# Patient Record
Sex: MALE | Race: WHITE | NOT HISPANIC OR LATINO | URBAN - METROPOLITAN AREA
[De-identification: names, ages, dates, MRNs, and addresses within clinical notes are randomized per-mention and may not be internally consistent; named-entity substitution may affect disease eponyms.]

---

## 2021-09-05 ENCOUNTER — EMERGENCY (EMERGENCY)
Facility: HOSPITAL | Age: 51
LOS: 1 days | Discharge: HOME | End: 2021-09-05
Attending: EMERGENCY MEDICINE | Admitting: EMERGENCY MEDICINE
Payer: COMMERCIAL

## 2021-09-05 VITALS
RESPIRATION RATE: 18 BRPM | DIASTOLIC BLOOD PRESSURE: 76 MMHG | TEMPERATURE: 98 F | OXYGEN SATURATION: 97 % | SYSTOLIC BLOOD PRESSURE: 132 MMHG | HEART RATE: 62 BPM | WEIGHT: 184.97 LBS

## 2021-09-05 PROCEDURE — 99283 EMERGENCY DEPT VISIT LOW MDM: CPT | Mod: 25

## 2021-09-05 PROCEDURE — 29125 APPL SHORT ARM SPLINT STATIC: CPT

## 2021-09-05 PROCEDURE — 99284 EMERGENCY DEPT VISIT MOD MDM: CPT | Mod: 25

## 2021-09-05 PROCEDURE — 73130 X-RAY EXAM OF HAND: CPT | Mod: 26,RT

## 2021-09-05 PROCEDURE — 73130 X-RAY EXAM OF HAND: CPT

## 2021-09-05 PROCEDURE — 29125 APPL SHORT ARM SPLINT STATIC: CPT | Mod: RT

## 2021-09-05 RX ORDER — IBUPROFEN 200 MG
600 TABLET ORAL ONCE
Refills: 0 | Status: COMPLETED | OUTPATIENT
Start: 2021-09-05 | End: 2021-09-05

## 2021-09-05 RX ADMIN — Medication 600 MILLIGRAM(S): at 13:46

## 2021-09-05 NOTE — ED ADULT NURSE NOTE - NSFALLRSKASSESSDT_ED_ALL_ED
Discharge instructions given to patient, patient verbalizes understanding of instructions. Patient alert and oriented x3, denies pain or shortness of breath at this time. Patient ambulatory, gait steady. Patient armband removed and given to patient to take home.   Patient was informed of the privacy risks if armband lost or stolen
05-Sep-2021 12:27

## 2021-09-05 NOTE — ED ADULT NURSE NOTE - CCCP TRG CHIEF CMPLNT
We've already received a denial from [facility] due to not meeting policy criteria. The patient must have done ONE of the following:    - Previously failed another biologic immunomodulator w/FDA approval for the same indication or    - Used one conventional agent prerequisite for the indication or    - Documented intolerance, FDA labeled contraindication, or hypersensitivity to ALL conventional agents    *Conventional Agents  - aminosalicylates  - corticosteroids (including budesonide EC capsule)  - azathiporine  - 6-MP  - cyclosporine    I can appeal this with an LMN if he does not meet any of the criteria above.     How would you like to proceed?       hand pain/injury

## 2021-09-05 NOTE — ED ADULT NURSE NOTE - NSFALLRSKOUTCOME_ED_ALL_ED
KRISTINE SANDOVAL note, thanks!    Zeynep Yap, PharmD  Medication Therapy Management Pharmacist  [phone]       Universal Safety Interventions

## 2021-09-05 NOTE — ED PROVIDER NOTE - CLINICAL SUMMARY MEDICAL DECISION MAKING FREE TEXT BOX
51 y/o M presents for right hand injury, pain and swelling s/p punching a wall last night. Pt denies paresthesias, motor/sensory deficits, or other injuries. pt is right hand dominant. PE as noted above. XR images reviewed- 4th distal metacarpal fx. Pt placed in ulnar gutter splint. will dc with hand f/u

## 2021-09-05 NOTE — ED PROVIDER NOTE - OBJECTIVE STATEMENT
51 y/o M presents for right hand injury, pain and swelling s/p punching a wall last night. Pt denies paresthesias, motor/sensory deficits, or other injuries. pt is right hand dominant

## 2021-09-05 NOTE — ED PROVIDER NOTE - PATIENT PORTAL LINK FT
You can access the FollowMyHealth Patient Portal offered by [facility] by registering at the following website: [email or web address]. By joining [employer]’s FollowMyHealth portal, you will also be able to view your health information using other applications (apps) compatible with our system.

## 2021-09-05 NOTE — ED PROVIDER NOTE - PHYSICAL EXAMINATION
msk: + TTP and swelling over the right 4th and 5th metacarpals and fingers. +sensation and motor intact.   good cap refill   no neurovasc  compromise on exam

## 2021-09-05 NOTE — ED PROVIDER NOTE - ATTENDING CONTRIBUTION TO CARE
Laila with [facility]. 49 y/o M presents for right hand injury, pain and swelling s/p punching a wall last night. Pt denies paresthesias, motor/sensory deficits, or other injuries. pt is right hand dominant. PE as noted above. XR images reviewed- 4th distal metacarpal fx. Pt placed in ulnar gutter splint. will dc with hand f/u    I performed a face to face bedside interview with patient regarding history of present illness, review of symptoms and past medical history. I completed an independent physical exam.  I have discussed the patient's plan of care with Physician Assistant (PA). I agree with note as stated above, having amended the EMR as needed to reflect my findings.   This includes History of Present Illness, HIV, Past Medical/Surgical/Family/Social History, Allergies and Home Medications, Review of Systems, Physical Exam, and any Progress Notes during the time I functioned as the attending physician for this patient.

## 2021-09-05 NOTE — ED PROVIDER NOTE - NSFOLLOWUPINSTRUCTIONS_ED_ALL_ED_FT
Follow up with the hand specialist within 2-3 days.     Rest, Ice 15 min on/ 15 min off, Elevate and keep compression of affected area. Take Ibuprofen 600mg Orally every 6 hours as needed for pain. Take the prescribed medication as directed for severe pain.   Keep your splint clean and dry.    Stay hydrated    Return to the ER if your symptoms worsen or for any other medical emergencies  *****    Hand Fracture    WHAT YOU NEED TO KNOW:    A hand fracture is a break in a bone in your hand.    DISCHARGE INSTRUCTIONS:    Return to the emergency department if:   •You have severe pain that does not get better, even with pain medicine.      •Your injured hand or forearm is cold, numb, or pale.       •Your cast or splint gets wet, damaged, or comes off.      Call your doctor or hand specialist if:   •You have new sores around your cast or splint.      •You notice a bad smell coming from under your cast.      •You have questions or concerns about your condition or care.      Medicines: You may need any of the following:   •NSAIDs help decrease swelling and pain or fever. This medicine is available with or without a doctor's order. NSAIDs can cause stomach bleeding or kidney problems in certain people. If you take blood thinner medicine, always ask your healthcare provider if NSAIDs are safe for you. Always read the medicine label and follow directions.      •Acetaminophen decreases pain and fever. It is available without a doctor's order. Ask how much to take and how often to take it. Follow directions. Read the labels of all other medicines you are using to see if they also contain acetaminophen, or ask your doctor or pharmacist. Acetaminophen can cause liver damage if not taken correctly. Do not use more than 4 grams (4,000 milligrams) total of acetaminophen in one day.       •Prescription pain medicine may be given. Ask your healthcare provider how to take this medicine safely. Some prescription pain medicines contain acetaminophen. Do not take other medicines that contain acetaminophen without talking to your healthcare provider. Too much acetaminophen may cause liver damage. Prescription pain medicine may cause constipation. Ask your healthcare provider how to prevent or treat constipation.       •Take your medicine as directed. Contact your healthcare provider if you think your medicine is not helping or if you have side effects. Tell him or her if you are allergic to any medicine. Keep a list of the medicines, vitamins, and herbs you take. Include the amounts, and when and why you take them. Bring the list or the pill bottles to follow-up visits. Carry your medicine list with you in case of an emergency.      Manage your symptoms:   •Wear a splint as directed. Do not remove the splint until you follow up with your healthcare provider or hand specialist.      •Apply ice on your hand for 15 to 20 minutes every hour or as directed. Use an ice pack, or put crushed ice in a plastic bag. Cover it with a towel before you apply it to your skin. Ice helps prevent tissue damage and decreases swelling and pain.      •Elevate your hand above the level of your heart as often as you can. This will help decrease swelling and pain. Prop your hand on pillows or blankets to keep it elevated comfortably.             •Go to physical therapy as directed. A physical therapist teaches you exercises to help improve movement and strength and to decrease pain.      Bathing with a cast or splint: Your healthcare provider will tell you when it is okay to take a bath or shower. Do not let your cast or splint get wet. Before bathing, cover the cast or splint with a plastic bag. Tape the bag to your skin above the cast or splint to seal out water. Keep your hand out of the water in case the bag breaks or tears.    Cast or splint care:   •Check the skin around the cast or splint for redness or sores every day.      •Do not push down or lean on any part of the cast or splint.      •Do not use a sharp or pointed object to scratch your skin under the cast or splint.      Activity: You may not be able to drive for up to 2 weeks. Ask when it is safe for you to drive and return to other activities, such as sports.    Follow up with your doctor or hand specialist as directed: You may need to return to have your cast, splint, or stitches removed. Write down your questions so you remember to ask them during your visits.

## 2021-09-05 NOTE — ED PROVIDER NOTE - CARE PROVIDER_API CALL
Renata Villarreal (MD)  Plastic Surgery  [address] [place] [address], [address]  [place], [place] [id number]  Phone: [phone]  Fax: [phone]  Follow Up Time:

## 2022-03-29 NOTE — ED PROVIDER NOTE - CHIEF COMPLAINT
Assessment/Plan:    Problem List Items Addressed This Visit        Cardiovascular and Mediastinum    Essential hypertension    Relevant Medications    lisinopril (ZESTRIL) 10 mg tablet    Other Relevant Orders    Basic metabolic panel      Other Visit Diagnoses     Other hyperlipidemia    -  Primary    Relevant Orders    Lipid panel    Need for hepatitis C screening test        Relevant Orders    Hepatitis C Antibody ([facility])    Screening for HIV (human immunodeficiency virus)        Relevant Orders    HIV 1/2 Antigen/Antibody (4th Generation) w Reflex [facility]    Encounter for immunization        Personal history of nicotine dependence        Relevant Orders    CT lung screening program    Screening for colorectal cancer        Relevant Orders    Cologuard    Cold intolerance        Relevant Orders    TSH, 3rd generation with Free T4 reflex    Encounter for screening for lung cancer        Relevant Orders    CT lung screening program    Atypical nevus        Relevant Orders    Ambulatory Referral to Dermatology        BMI Counseling: Body mass index is 27 07 kg/m²  The BMI is above normal  Nutrition recommendations include limiting drinks that contain sugar  Rationale for BMI follow-up plan is due to patient being overweight or obese  I reassured him that the lesion in his right inguinal region is is seborrheic keratosis and is benign  Encouraged him to go to the dentist     Chief Complaint     Physical Exam          Patient ID: Chandan Pitt is a 60 y o  male who returns for a preventive visit  He was asked to schedule a visit for hypertension follow up as well  He wants to switch to my care  His wife is concerned because his hands seem to be cold all the time and worried whether he might have a thyroid problem  He was concerned about a lesion in his right groin that he wanted to have evaluated        Objective:    /82 (BP Location: Left arm, Patient Position: Sitting)   Pulse 76   Temp 97 6 °F (36 4 °C)   Ht 6' 1" (1 854 m)   Wt 93 1 kg (205 lb 3 2 oz)   SpO2 98%   BMI 27 07 kg/m²     Wt Readings from Last 3 Encounters:   03/29/22 93 1 kg (205 lb 3 2 oz)   12/15/21 95 7 kg (211 lb)   10/20/21 95 7 kg (211 lb)         Physical Exam  Vitals reviewed  Constitutional:       General: He is not in acute distress  Appearance: Normal appearance  He is well-developed  He is not ill-appearing  HENT:      Head: Normocephalic and atraumatic  Right Ear: Tympanic membrane and external ear normal       Left Ear: Tympanic membrane and external ear normal       Nose: Nose normal       Mouth/Throat:      Mouth: Mucous membranes are moist       Pharynx: Oropharynx is clear  Eyes:      General: No scleral icterus  Neck:      Thyroid: No thyromegaly  Vascular: No JVD  Cardiovascular:      Rate and Rhythm: Normal rate and regular rhythm  Heart sounds: Normal heart sounds  No murmur heard  No friction rub  No gallop  Pulmonary:      Breath sounds: Normal breath sounds  Abdominal:      General: Bowel sounds are normal  There is no distension  Palpations: Abdomen is soft  There is no mass  Musculoskeletal:         General: No swelling  Skin:     Comments: Numerous moles on his back and trunk  There is a 3 mm variegated mole with irregular borders on the right upper abdomen (see photograph attached to this encounter)  There is a waxy brown stuck on papule in his right inguinal region  Neurological:      Mental Status: He is alert     Psychiatric:         Mood and Affect: Mood normal  The patient is a 50y Male complaining of hand pain/injury.

## 2024-03-06 NOTE — ED ADULT NURSE NOTE - NS TRANSFER PATIENT BELONGINGS
Cancel with [facility] on 03-04  Received: Today  [facility] Preauth Pool  This patient's surgery with Dr. Greg Galindo was cancelled for 03-04.    Thanks, Francesca   Clothing